# Patient Record
Sex: FEMALE | Race: WHITE | NOT HISPANIC OR LATINO | Employment: OTHER | URBAN - METROPOLITAN AREA
[De-identification: names, ages, dates, MRNs, and addresses within clinical notes are randomized per-mention and may not be internally consistent; named-entity substitution may affect disease eponyms.]

---

## 2017-10-30 ENCOUNTER — TRANSCRIBE ORDERS (OUTPATIENT)
Dept: ADMINISTRATIVE | Facility: HOSPITAL | Age: 82
End: 2017-10-30

## 2017-10-30 DIAGNOSIS — Z12.31 VISIT FOR SCREENING MAMMOGRAM: Primary | ICD-10-CM

## 2017-12-18 ENCOUNTER — OFFICE VISIT (OUTPATIENT)
Dept: GASTROENTEROLOGY | Facility: CLINIC | Age: 82
End: 2017-12-18

## 2017-12-18 VITALS
WEIGHT: 114.6 LBS | BODY MASS INDEX: 20.3 KG/M2 | DIASTOLIC BLOOD PRESSURE: 76 MMHG | SYSTOLIC BLOOD PRESSURE: 132 MMHG | HEIGHT: 63 IN | TEMPERATURE: 97.8 F

## 2017-12-18 DIAGNOSIS — R10.13 DYSPEPSIA: Primary | ICD-10-CM

## 2017-12-18 DIAGNOSIS — K21.9 GASTROESOPHAGEAL REFLUX DISEASE, ESOPHAGITIS PRESENCE NOT SPECIFIED: ICD-10-CM

## 2017-12-18 DIAGNOSIS — K63.5 POLYP OF COLON, UNSPECIFIED PART OF COLON, UNSPECIFIED TYPE: ICD-10-CM

## 2017-12-18 DIAGNOSIS — R05.9 COUGH: ICD-10-CM

## 2017-12-18 PROCEDURE — 99213 OFFICE O/P EST LOW 20 MIN: CPT | Performed by: INTERNAL MEDICINE

## 2017-12-18 NOTE — PROGRESS NOTES
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 Doesn't cough much. She has phlegm in the back of her throat and clears her throat. No dysphagia or odynaphagia. No nausea or vomiting. No SOA. No diarrhea or constipation. No rectal bleeding or melena. She has lost a few pounds in the last few years but she exercises lots. She is back on Nexium and she thinks that it may help.     Past Medical History:   Diagnosis Date   • Cataract    • Osteoarthritis    • Skin cancer        Past Surgical History:   Procedure Laterality Date   • BREAST BIOPSY     • CATARACT EXTRACTION     • COLONOSCOPY  2010    normal per patient   • OOPHORECTOMY  1993   • SKIN CANCER EXCISION     • TOTAL ABDOMINAL HYSTERECTOMY      With removal of both ovaries   • UPPER GASTROINTESTINAL ENDOSCOPY  07/09/2013    gastritis         Current Outpatient Prescriptions:   •  Cholecalciferol (VITAMIN D3) 1000 UNITS capsule, Take by mouth., Disp: , Rfl:   •  esomeprazole (NEXIUM 24HR) 20 MG capsule, , Disp: , Rfl:   •  azithromycin (ZITHROMAX) 250 MG tablet, Take by mouth. Take 2 tablets on day 1 then take 1 tablet a day for 4 days, Disp: , Rfl:   •  EPINEPHrine (EPIPEN) 0.3 MG/0.3ML solution auto-injector injection, EpiPen 0.3 MG/0.3ML DESIRE; Patient Sig: EpiPen 0.3 MG/0.3ML DESIRE INJECT 1 PEN AS DIRECTED.; 1; 0; 19-Feb-2013; Active; EPIPEN 0.3MG. INJ 1 PACK (YELLOW), Disp: , Rfl:   •  Zoledronic Acid (RECLAST IV), Infuse  into a venous catheter., Disp: , Rfl:     Allergies   Allergen Reactions   • Penicillins    • Shellfish Allergy        Family History   Problem Relation Age of Onset   • Hypertension Mother    • No Known Problems Father      n/a   • Stroke Maternal Grandmother        Social History     Social History   • Marital status:      Spouse name: N/A   • Number of children: N/A   • Years of education: N/A      Occupational History   • Not on file.     Social History Main Topics   • Smoking status: Former Smoker     Quit date: 1956   • Smokeless tobacco: Never Used      Comment: age [age]   • Alcohol use Yes      Comment: wine with dinner   • Drug use: No   • Sexual activity: Not on file     Other Topics Concern   • Not on file     Social History Narrative       Review of Systems   All other systems reviewed and are negative.      Vitals:    12/18/17 0854   BP: 132/76   Temp: 97.8 °F (36.6 °C)       Physical Exam   Constitutional: She is oriented to person, place, and time. She appears well-developed and well-nourished. No distress.   HENT:   Head: Normocephalic and atraumatic. Hair is normal.   Right Ear: Hearing, tympanic membrane, external ear and ear canal normal. No drainage. No decreased hearing is noted.   Left Ear: Hearing, tympanic membrane, external ear and ear canal normal. No decreased hearing is noted.   Nose: No nasal deformity.   Mouth/Throat: Oropharynx is clear and moist.   Eyes: Conjunctivae, EOM and lids are normal. Pupils are equal, round, and reactive to light. Right eye exhibits no discharge. Left eye exhibits no discharge.   Neck: Normal range of motion. Neck supple. No JVD present. No tracheal deviation present. No thyromegaly present.   Cardiovascular: Normal rate, regular rhythm, normal heart sounds, intact distal pulses and normal pulses.  Exam reveals no gallop and no friction rub.    No murmur heard.  Pulmonary/Chest: Effort normal and breath sounds normal. No respiratory distress. She has no wheezes. She has no rales. She exhibits no tenderness.   Abdominal: Soft. Bowel sounds are normal. She exhibits no distension and no mass. There is no tenderness. There is no rebound and no guarding. No hernia.   Genitourinary: Rectal exam shows guaiac negative stool.   Genitourinary Comments: Normal anorectal exam.   Musculoskeletal: Normal range of motion. She exhibits no edema, tenderness or  deformity.   Lymphadenopathy:     She has no cervical adenopathy.   Neurological: She is alert and oriented to person, place, and time. She has normal reflexes. She displays normal reflexes. No cranial nerve deficit. She exhibits normal muscle tone. Coordination normal.   Skin: Skin is warm and dry. No rash noted. She is not diaphoretic. No erythema.   Psychiatric: She has a normal mood and affect. Her behavior is normal. Judgment and thought content normal.   Vitals reviewed.      Guilherme was seen today for frequent clearing of throat.    Diagnoses and all orders for this visit:    Dyspepsia  -     FL Esophagram Complete    Gastroesophageal reflux disease, esophagitis presence not specified  -     FL Esophagram Complete    Cough  -     FL Esophagram Complete    Polyp of colon, unspecified part of colon, unspecified type       Assessment:  1) Dyspepsia  2) Coughing up stuff  3) h/o colon polyps.    Recommendations:  1) Barium swallow.  2) We discussed the pros and cons of doing a coloscopy in an 81 yo. She wants to think about it.   3) f/u 3 mos.      Return in about 3 months (around 3/18/2018).    Greg Edmonds MD  12/18/2017

## 2017-12-21 ENCOUNTER — HOSPITAL ENCOUNTER (OUTPATIENT)
Dept: MAMMOGRAPHY | Facility: HOSPITAL | Age: 82
Discharge: HOME | End: 2017-12-21
Admitting: INTERNAL MEDICINE

## 2017-12-21 DIAGNOSIS — Z12.31 VISIT FOR SCREENING MAMMOGRAM: ICD-10-CM

## 2017-12-21 PROCEDURE — G0202 SCR MAMMO BI INCL CAD: HCPCS

## 2017-12-21 PROCEDURE — 77063 BREAST TOMOSYNTHESIS BI: CPT

## 2018-01-10 ENCOUNTER — APPOINTMENT (OUTPATIENT)
Dept: GENERAL RADIOLOGY | Facility: HOSPITAL | Age: 83
End: 2018-01-10
Attending: INTERNAL MEDICINE

## 2018-03-13 ENCOUNTER — HOSPITAL ENCOUNTER (OUTPATIENT)
Dept: GENERAL RADIOLOGY | Facility: HOSPITAL | Age: 83
Discharge: HOME | End: 2018-03-13
Attending: INTERNAL MEDICINE | Admitting: INTERNAL MEDICINE

## 2018-03-13 PROCEDURE — 63710000001 SOD BICARB-CITRIC ACID-SIMETHICONE 2.21-1.53-0.04 G PACK: Performed by: INTERNAL MEDICINE

## 2018-03-13 PROCEDURE — 63710000001 BARIUM SULFATE 96 % RECONSTITUTED SUSPENSION: Performed by: INTERNAL MEDICINE

## 2018-03-13 PROCEDURE — 63710000001 BARIUM SULFATE 98 % RECONSTITUTED SUSPENSION: Performed by: INTERNAL MEDICINE

## 2018-03-13 PROCEDURE — A9270 NON-COVERED ITEM OR SERVICE: HCPCS | Performed by: INTERNAL MEDICINE

## 2018-03-13 PROCEDURE — 74220 X-RAY XM ESOPHAGUS 1CNTRST: CPT

## 2018-03-13 PROCEDURE — 63710000001 BARIUM SULFATE 700 MG TABLET: Performed by: INTERNAL MEDICINE

## 2018-03-13 RX ADMIN — BARIUM SULFATE 183 ML: 960 POWDER, FOR SUSPENSION ORAL at 09:30

## 2018-03-13 RX ADMIN — BARIUM SULFATE 135 ML: 980 POWDER, FOR SUSPENSION ORAL at 09:30

## 2018-03-13 RX ADMIN — BARIUM SULFATE 700 MG: 700 TABLET ORAL at 09:30

## 2018-03-13 RX ADMIN — ANTACID/ANTIFLATULENT 1 TABLET: 380; 550; 10; 10 GRANULE, EFFERVESCENT ORAL at 09:30

## 2018-03-19 ENCOUNTER — OFFICE VISIT (OUTPATIENT)
Dept: GASTROENTEROLOGY | Facility: CLINIC | Age: 83
End: 2018-03-19

## 2018-03-19 VITALS
WEIGHT: 115.4 LBS | HEIGHT: 63 IN | DIASTOLIC BLOOD PRESSURE: 76 MMHG | BODY MASS INDEX: 20.45 KG/M2 | SYSTOLIC BLOOD PRESSURE: 132 MMHG | TEMPERATURE: 98 F

## 2018-03-19 DIAGNOSIS — Z12.11 ENCOUNTER FOR SCREENING FOR MALIGNANT NEOPLASM OF COLON: Primary | ICD-10-CM

## 2018-03-19 PROCEDURE — 99213 OFFICE O/P EST LOW 20 MIN: CPT | Performed by: INTERNAL MEDICINE

## 2018-03-19 NOTE — PROGRESS NOTES
Chief Complaint   Patient presents with   • Dyspepsia       History of Present Illness: 83 yo female who was c/o coughing up stuff. She had a barium swallow earlier this month that showed:  CONCLUSION: Diffusely relatively narrow cervical and thoracic esophagus  as discussed above with the barium tablet lodging for a few seconds at  the upper thoracic esophagus before being carried into the distal  stomach by additional swallows of water. Small hiatal hernia with  minimal if any gastroesophageal reflux. Laryngeal penetration without  aspiration. The esophageal mucosal folds appear to have normal  thickness. Upper laryngeal penetration was demonstrated and the patient  manifest throat clearing on several occasions during the exam.     This report was finalized on 3/14/2018 7:17 AM by Dr. Kevin Espinosa MD.     NO dysphagia but still coughing up congealed junk and with phlegm in back of throat which comes and goes. No nausea or vomiting. NO diarrhea, occasional constipation. NO recctal bleeding or melena. She has a dull ache intermittently in the left chest. It is worse the day after lifing weights. No SOA.     Past Medical History:   Diagnosis Date   • Cataract    • Osteoarthritis    • Skin cancer        Past Surgical History:   Procedure Laterality Date   • BREAST BIOPSY     • CATARACT EXTRACTION     • COLONOSCOPY  2010    normal per patient   • OOPHORECTOMY  1993   • SKIN CANCER EXCISION     • TOTAL ABDOMINAL HYSTERECTOMY      With removal of both ovaries   • UPPER GASTROINTESTINAL ENDOSCOPY  07/09/2013    gastritis         Current Outpatient Prescriptions:   •  Cholecalciferol (VITAMIN D3) 1000 UNITS capsule, Take by mouth., Disp: , Rfl:   •  EPINEPHrine (EPIPEN) 0.3 MG/0.3ML solution auto-injector injection, EpiPen 0.3 MG/0.3ML DESIRE; Patient Sig: EpiPen 0.3 MG/0.3ML DESIRE INJECT 1 PEN AS DIRECTED.; 1; 0; 19-Feb-2013; Active; EPIPEN 0.3MG. INJ 1 PACK (YELLOW), Disp: , Rfl:   •  Zoledronic Acid (RECLAST IV), Infuse   into a venous catheter., Disp: , Rfl:     Allergies   Allergen Reactions   • Shellfish Allergy Anaphylaxis   • Penicillins Other (See Comments)     Pt not sure of reaction, it was many years ago.       Family History   Problem Relation Age of Onset   • Hypertension Mother    • No Known Problems Father      n/a   • Stroke Maternal Grandmother        Social History     Social History   • Marital status:      Spouse name: N/A   • Number of children: N/A   • Years of education: N/A     Occupational History   • Not on file.     Social History Main Topics   • Smoking status: Former Smoker     Quit date: 1956   • Smokeless tobacco: Never Used      Comment: age [age]   • Alcohol use Yes      Comment: wine with dinner   • Drug use: No   • Sexual activity: Not on file     Other Topics Concern   • Not on file     Social History Narrative   • No narrative on file       Review of Systems   All other systems reviewed and are negative.      Vitals:    03/19/18 0859   BP: 132/76   Temp: 98 °F (36.7 °C)       Physical Exam   Constitutional: She is oriented to person, place, and time. She appears well-developed and well-nourished. No distress.   HENT:   Head: Normocephalic and atraumatic. Hair is normal.   Right Ear: Hearing, tympanic membrane, external ear and ear canal normal. No drainage. No decreased hearing is noted.   Left Ear: Hearing, tympanic membrane, external ear and ear canal normal. No decreased hearing is noted.   Nose: No nasal deformity.   Mouth/Throat: Oropharynx is clear and moist.   Eyes: Conjunctivae, EOM and lids are normal. Pupils are equal, round, and reactive to light. Right eye exhibits no discharge. Left eye exhibits no discharge.   Neck: Normal range of motion. Neck supple. No JVD present. No tracheal deviation present. No thyromegaly present.   Cardiovascular: Normal rate, regular rhythm, normal heart sounds, intact distal pulses and normal pulses.  Exam reveals no gallop and no friction rub.    No  murmur heard.  Pulmonary/Chest: Effort normal and breath sounds normal. No respiratory distress. She has no wheezes. She has no rales. She exhibits no tenderness.   Abdominal: Soft. Bowel sounds are normal. She exhibits no distension and no mass. There is no tenderness. There is no rebound and no guarding. No hernia.   Musculoskeletal: Normal range of motion. She exhibits no edema, tenderness or deformity.   Lymphadenopathy:     She has no cervical adenopathy.   Neurological: She is alert and oriented to person, place, and time. She has normal reflexes. She displays normal reflexes. No cranial nerve deficit. She exhibits normal muscle tone. Coordination normal.   Skin: Skin is warm and dry. No rash noted. She is not diaphoretic. No erythema.   Psychiatric: She has a normal mood and affect. Her behavior is normal. Judgment and thought content normal.   Vitals reviewed.      Guilherme was seen today for dyspepsia.    Diagnoses and all orders for this visit:    Encounter for screening for malignant neoplasm of colon  -     Cologuard - Stool, Per Rectum      Assessment:  1) Postnasal drip  2) Left chest pain that sounds like it is musculoskeletal in that it is worse the day after lifting weights with her arms.  3) h/o colon polyps.     Recommendations:  1) We discussed possibly doing an EGD to dilate the esophagus.  2) Trial of Claritin D to see if postnasal drip goes away.  3) ColoGuard - we discussed pros and cons and pros and cons of colonoscopy.  4) f/u 6 mos.    Return in about 6 months (around 9/19/2018).    Greg Edmonds MD  3/19/2018

## 2018-04-05 ENCOUNTER — TELEPHONE (OUTPATIENT)
Dept: GASTROENTEROLOGY | Facility: CLINIC | Age: 83
End: 2018-04-05

## 2018-04-05 NOTE — TELEPHONE ENCOUNTER
----- Message from Greg Edmonds MD sent at 3/17/2018  5:58 PM EDT -----  Tell her that the barium swallow shows a small hiatal hernia and some narrowing of the mid and upper esophagus. We can discuss in more detail when I see her in the office soon. elaine

## 2018-11-05 ENCOUNTER — TRANSCRIBE ORDERS (OUTPATIENT)
Dept: ADMINISTRATIVE | Facility: HOSPITAL | Age: 83
End: 2018-11-05

## 2018-11-05 DIAGNOSIS — Z12.31 VISIT FOR SCREENING MAMMOGRAM: Primary | ICD-10-CM

## 2019-01-09 ENCOUNTER — HOSPITAL ENCOUNTER (OUTPATIENT)
Dept: MAMMOGRAPHY | Facility: HOSPITAL | Age: 84
Discharge: HOME | End: 2019-01-09
Admitting: INTERNAL MEDICINE

## 2019-01-09 DIAGNOSIS — Z12.31 VISIT FOR SCREENING MAMMOGRAM: ICD-10-CM

## 2019-01-09 PROCEDURE — 77063 BREAST TOMOSYNTHESIS BI: CPT

## 2019-01-09 PROCEDURE — 77067 SCR MAMMO BI INCL CAD: CPT

## 2019-11-12 ENCOUNTER — TRANSCRIBE ORDERS (OUTPATIENT)
Dept: ADMINISTRATIVE | Facility: HOSPITAL | Age: 84
End: 2019-11-12

## 2019-11-12 DIAGNOSIS — Z12.31 SCREENING MAMMOGRAM, ENCOUNTER FOR: Primary | ICD-10-CM

## 2020-01-13 ENCOUNTER — HOSPITAL ENCOUNTER (OUTPATIENT)
Dept: MAMMOGRAPHY | Facility: HOSPITAL | Age: 85
Discharge: HOME | End: 2020-01-13
Admitting: INTERNAL MEDICINE

## 2020-01-13 DIAGNOSIS — Z12.31 SCREENING MAMMOGRAM, ENCOUNTER FOR: ICD-10-CM

## 2020-01-13 PROCEDURE — 77067 SCR MAMMO BI INCL CAD: CPT

## 2020-01-13 PROCEDURE — 77063 BREAST TOMOSYNTHESIS BI: CPT

## 2020-12-04 ENCOUNTER — TRANSCRIBE ORDERS (OUTPATIENT)
Dept: ADMINISTRATIVE | Facility: HOSPITAL | Age: 85
End: 2020-12-04

## 2020-12-04 DIAGNOSIS — Z12.39 SCREENING BREAST EXAMINATION: Primary | ICD-10-CM

## 2021-03-18 ENCOUNTER — APPOINTMENT (OUTPATIENT)
Dept: MAMMOGRAPHY | Facility: HOSPITAL | Age: 86
End: 2021-03-18

## 2021-04-30 ENCOUNTER — HOSPITAL ENCOUNTER (OUTPATIENT)
Dept: MAMMOGRAPHY | Facility: HOSPITAL | Age: 86
Discharge: HOME | End: 2021-04-30
Admitting: INTERNAL MEDICINE

## 2021-04-30 DIAGNOSIS — Z12.39 SCREENING BREAST EXAMINATION: ICD-10-CM

## 2021-04-30 PROCEDURE — 77063 BREAST TOMOSYNTHESIS BI: CPT

## 2021-04-30 PROCEDURE — 77067 SCR MAMMO BI INCL CAD: CPT
